# Patient Record
Sex: FEMALE | Race: AMERICAN INDIAN OR ALASKA NATIVE
[De-identification: names, ages, dates, MRNs, and addresses within clinical notes are randomized per-mention and may not be internally consistent; named-entity substitution may affect disease eponyms.]

---

## 2017-07-25 NOTE — MAMMOGRAPHY REPORT
BILATERAL DIGITAL SCREENING MAMMOGRAM with CAD: 07/25/17 15:25:00



CLINICAL: Baseline screening.



FINDINGS: The breasts are heterogeneously dense, which may obscures 

small masses.  Bilateral asymmetries require additional imaging.No 

architectural distortion or suspicious calcifications.



IMPRESSION: Bilateral asymmetries requiring further workup.



BI-RADS CATEGORY:  0 -- Additional Imaging Evaluation Required



RECOMMENDATION: Recall for bilateral true lateral and spot compression 

views and bilateral breast ultrasound if needed.



ACR BI-RADS MAMMOGRAPHIC CODES:

0 = Needs additional imaging evaluation; 1 = Negative; 2 = Benign; 3 = 

Probably benign; 4 = Suspicious; 5 = Malignant; 6 = Known biopsy-proven 

malignancy



COMMENT:

      1.   Dense breast tissue, i.e., adenosis, fibrocystic 

            changes, etc., may obscure an underlying neoplasm.

      2.   Approximately 10% of cancers are not detected with

            mammography.

      3.   A negative mammography report should not delay biopsy 

            if a clinically suspicious mass is present.



COMMENT:

Patient follow-up letters are generated via our [employer] application.

## 2018-07-27 NOTE — MAMMOGRAPHY REPORT
BILATERAL DIGITAL SCREENING MAMMOGRAM with CAD and  DIGITAL BREAST 

TOMOSYNTHESIS (DBT) : 07/26/18 



CLINICAL: Routine screening.



COMPARISON:07/25/17



FINDINGS: The breasts are heterogeneously dense, which may obscure 

small masses. No mass, architectural distortion or suspicious 

calcifications.



IMPRESSION: No mammographic evidence of malignancy.



BI-RADS CATEGORY: 1 - - Negative



RECOMMENDATION: Routine mammographic screening in one year.





COMMENT:

Patient follow-up letters are generated by our [employer] application.

## 2018-07-27 NOTE — MAMMOGRAPHY REPORT
BILATERAL DIGITAL SCREENING MAMMOGRAM with CAD and  DIGITAL BREAST 

TOMOSYNTHESIS (DBT) : 07/26/18 



CLINICAL: Routine screening.



COMPARISON:07/25/17



FINDINGS: The breasts are heterogeneously dense, which may obscure 

small masses. No mass, architectural distortion or suspicious 

calcifications.



IMPRESSION: No mammographic evidence of malignancy.



BI-RADS CATEGORY: 1 - - Negative



RECOMMENDATION: Routine mammographic screening in one year.





COMMENT:

Patient follow-up letters are generated by our [employer] application.

## 2019-08-02 ENCOUNTER — HOSPITAL ENCOUNTER (OUTPATIENT)
Dept: HOSPITAL 5 - SPVWC | Age: 44
Discharge: HOME | End: 2019-08-02
Attending: OBSTETRICS & GYNECOLOGY
Payer: COMMERCIAL

## 2019-08-02 DIAGNOSIS — Z12.31: Primary | ICD-10-CM

## 2019-08-02 PROCEDURE — 77063 BREAST TOMOSYNTHESIS BI: CPT

## 2019-08-02 PROCEDURE — 77067 SCR MAMMO BI INCL CAD: CPT

## 2019-08-06 NOTE — MAMMOGRAPHY REPORT
DIGITAL SCREENING MAMMOGRAM WITH TOMOSYNTHESIS WITH CAD, 8/2/2019



INDICATION: Screening. 



TECHNIQUE:  Digital bilateral 2D and 3D mammography with tomosynthesis was obtained in the craniocaud
al and mediolateral oblique projections.  Computer-Aided Detection (CAD) analysis was used for interp
retation of this study.



COMPARISON: 7/25/2017



FINDINGS: 



Breast Density: The breasts are heterogeneously dense, which may obscure small masses.



There is no evidence of dominant mass, suspicious calcifications or architectural distortion in the l
eft breast. There is questionable small nodular density projecting within the deep retroareolar right
 breast. Measurements are approximately 10 mm in diameter and approximately 8 cm from the nipple. It 
is difficult to determine whether this is nodular fibroglandular tissue versus possible true mass. Re
commend further evaluation with right breast ultrasound and possibly spot compression 3-D imaging.





IMPRESSION: Questionable nodular density in the right deep retroareolar breast. Recommend further renata
luation with right breast ultrasound. No sonographic correlate is identified, spot compression imagin
g may be required as well. 



BI-RADS Category 0: Incomplete. Needs additional imaging evaluation and/or prior mammograms for eduardo
rison.



A "normal" or negative report should not discourage follow up or biopsy of a clinically significant f
inding.



A written summary of these findings will be mailed to the patient. The patient will be entered into a
 mammography reporting system which will generate a reminder letter for the patient's next appointmen
t at the appropriate interval.



The [employer] recommends yearly mammograms starting at age 40 and continuing as l
igor as a woman is in good health.  Breast MRI is recommended for women with an approximate 20-25% or 
greater lifetime risk of breast cancer, including women with a strong family history of breast or ova
naima cancer or who have been treated for Hodgkin's disease.



Signer Name: Darby Galvan MD 

Signed: 8/6/2019 10:17 AM

 Workstation Name: [id number]